# Patient Record
Sex: MALE | Race: OTHER
[De-identification: names, ages, dates, MRNs, and addresses within clinical notes are randomized per-mention and may not be internally consistent; named-entity substitution may affect disease eponyms.]

---

## 2018-10-14 ENCOUNTER — HOSPITAL ENCOUNTER (EMERGENCY)
Dept: HOSPITAL 80 - FED | Age: 28
Discharge: HOME | End: 2018-10-14
Payer: MEDICAID

## 2018-10-14 VITALS — DIASTOLIC BLOOD PRESSURE: 75 MMHG | SYSTOLIC BLOOD PRESSURE: 133 MMHG

## 2018-10-14 DIAGNOSIS — S61.232A: ICD-10-CM

## 2018-10-14 DIAGNOSIS — S63.295A: ICD-10-CM
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
## 2018-10-14 DIAGNOSIS — S62.635B: Primary | ICD-10-CM
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
## 2018-10-14 LAB — PLATELET # BLD: 199 10^3/UL (ref 150–400)

## 2018-10-14 PROCEDURE — 0HQGXZZ REPAIR LEFT HAND SKIN, EXTERNAL APPROACH: ICD-10-PCS

## 2018-10-14 NOTE — EDPHY
H & P


Time Seen by Provider: 10/14/18 16:06


HPI/ROS: 





CHIEF COMPLAINT:  Bilateral hand pain with multiple dog bites





HISTORY OF PRESENT ILLNESS:  Patient is a 28 year old male brought here by EMS 

from the local dog park after he was attacked by a pit bull.  He states that he 

was walking his dog at the dog park when dog was attacked by a pit bull.  He 

attempted to break up the fight between his dog and the pit bull and the pebble 

latched onto both of his hands.  Currently complains of inability to flex the 

left 4th digit and pain to the right middle digit.  His immunizations are up-to-

date.  Animal control was called and saw the owner of the pit bull at the dog 

park.





REVIEW OF SYSTEMS:


Constitutional:  No fever, no chills.


Eyes:  No discharge.


ENT:  No sore throat.


Cardiovascular:  No chest pain, no palpitations.


Respiratory:  No cough, no shortness of breath.


Gastrointestinal:  No abdominal pain, no vomiting.


Genitourinary:  No hematuria.


Musculoskeletal:  No back pain.


Skin:  No rashes.  Multiple skin lacerations


Neurological:  No headache.


Smoking Status: Light smoker


Physical Exam: 





General Appearance:  Alert and no distress.


Eyes:  Pupils equal and round no injection.


Respiratory:  Chest is nontender, lungs are clear to auscultation.


Cardiac:  regular rate and rhythm.


Gastrointestinal:  Abdomen is soft and nontender, no masses, bowel sounds 

normal.


Musculoskeletal:  Neck is supple and nontender.


Extremities examination of the left hand reveals deformity of the IP and distal 

phalanx of the 4th digit.  He is unable to flex the PIP of the 4th digit.  Cap 

refill is less than 3 sec in the 4th digit and sensation is intact.  There are 

2 lacerations to the lateral aspects of the digit.  Examination of the right 

hand reveals puncture wound over the middle phalanx on the flexor aspect but 

there is full range of motion and no deformity.


Skin:  No rashes or lesions.





Constitutional: 


 Initial Vital Signs











Temperature (C)  37.0 C   10/14/18 16:21


 


Heart Rate  80   10/14/18 16:21


 


Respiratory Rate  18   10/14/18 16:21


 


Blood Pressure  166/91 H  10/14/18 16:21


 


O2 Sat (%)  98   10/14/18 16:21








 











O2 Delivery Mode               Room Air














Allergies/Adverse Reactions: 


 





No Known Allergies Allergy (Verified 07/21/14 15:21)


 








Home Medications: 














 Medication  Instructions  Recorded


 


Albuterol [Proventil Inhaler (RX)] 1 - 2 puffs IH Q4 PRN 01/18/13


 


Oxycodone Ir [Oxy Ir 5 mg (RX)]  07/21/14


 


Amoxicillin/Clavulanate Pot 875 mg PO BID #14 tab 10/14/18





[Augmentin 875 MG TAB (*)]  


 


Hydrocodone/APAP 5/325 [Norco 1 - 2 tab PO Q4H PRN #10 tab 10/14/18





5/325 (*)]  














Medical Decision Making





- Diagnostics


Imaging Results: 


 Imaging Impressions





Finger X-Ray  10/14/18 16:10


Impression: Left fourth distal phalanx fracture dislocation with volar plate 

avulsion fracture fragments and dorsal subluxation/dislocation.








Finger X-Ray  10/14/18 16:11


Impression: No fracture of the right third finger.











Procedures: 





Procedure:  Laceration repair.





Verbal consent was obtained from the patient.  The 3 cm laceration on the left 

4th digit was anesthetized in the usual fashion.  The wound was irrigated, 

draped and explored.  The laceration is directly over the DIP and there is 

deformity of the joint.  No visible bony involvement.  The wound was repaired 

with 4-0 is Prolene 6 simple interrupted sutures.  The wound repair was 

approximated  The procedure was performed by myself.


ED Course/Re-evaluation: 


Patient here with dog bite to both his hands.  On arrival he was given Norco 

and Augmentin and x-rays were ordered to evaluate for fracture or foreign body.

  X-rays revealed traumatic dislocation of distal phalanx of the 4th digit on 

the left hand and normal appearing x-rays on the right hand.  Given that this 

is a traumatic open dislocation with dog bite to the left hand patient was 

given IV Unasyn and Dr Butt was paged. Dr Butt was consulted and 

recommended approximation of the wound after thorough irrigation.  He agrees to 

see the patient tomorrow morning for further evaluation.  Laceration repair as 

detailed below.  Patient was neurovascular intact at time of discharge.  





- Data Points


Laboratory Results: 


 Laboratory Results





 10/14/18 16:45 





 10/14/18 16:45 





 











  10/14/18 10/14/18





  16:45 16:45


 


WBC    5.94 10^3/uL 10^3/uL





    (3.80-9.50) 


 


RBC    5.10 10^6/uL 10^6/uL





    (4.40-6.38) 


 


Hgb    15.2 g/dL g/dL





    (13.7-17.5) 


 


Hct    42.2 % %





    (40.0-51.0) 


 


MCV    82.7 fL fL





    (81.5-99.8) 


 


MCH    29.8 pg pg





    (27.9-34.1) 


 


MCHC    36.0 g/dL g/dL





    (32.4-36.7) 


 


RDW    12.3 % %





    (11.5-15.2) 


 


Plt Count    199 10^3/uL 10^3/uL





    (150-400) 


 


Sodium  139 mEq/L mEq/L  





   (135-145)  


 


Potassium  4.0 mEq/L mEq/L  





   (3.3-5.0)  


 


Chloride  104 mEq/L mEq/L  





   ()  


 


Carbon Dioxide  23 mEq/l mEq/l  





   (22-31)  


 


Anion Gap  12 mEq/L mEq/L  





   (6-14)  


 


BUN  12 mg/dL mg/dL  





   (7-23)  


 


Creatinine  0.8 mg/dL mg/dL  





   (0.7-1.3)  


 


Estimated GFR  > 60   





   


 


Glucose  111 mg/dL H mg/dL  





   ()  


 


Calcium  9.3 mg/dL mg/dL  





   (8.5-10.4)  











Medications Given: 


 








Discontinued Medications





Hydrocodone Bitart/Acetaminophen (Norco 5/325)  1 tab PO EDNOW ONE


   Stop: 10/14/18 16:13


   Last Admin: 10/14/18 16:19 Dose:  1 tab


Amoxicillin/Clavulanate Potassium (Augmentin 875mg)  875 mg PO EDNOW ONE


   PRN Reason: Protocol


   Stop: 10/14/18 16:11


   Last Admin: 10/14/18 16:18 Dose:  875 mg


Ampicillin Sodium/Sulbactam (Sodium 3 gm/ Sodium Chloride)  100 mls @ 200 mls/

hr IV EDNOW ONE


   PRN Reason: Protocol


   Stop: 10/14/18 17:05


   Last Admin: 10/14/18 17:16 Dose:  100 mls








Departure





- Departure


Disposition: Home, Routine, Self-Care


Clinical Impression: 


 Dog bite of left hand, Dog bite of right hand, Finger laceration





Condition: Good


Instructions:  Animal Bite (ED), Finger Laceration (ED)


Additional Instructions: 


Please call Orthopedics tomorrow morning.  She be seen tomorrow morning by 

Orthopedics for further evaluation.  Take the 2nd dose of Augmentin tomorrow 

morning.  Return to the ER if you develop uncontrolled pain, discoloration of 

the finger if turning blue or ashen.


Referrals: 


NONE *PRIMARY CARE P,. [Primary Care Provider] - As per Instructions


Yousuf Butt MD [Medical Doctor] - As per Instructions


Stand Alone Forms:  School Excuse


Prescriptions: 


Amoxicillin/Clavulanate Pot [Augmentin 875 MG TAB (*)] 875 mg PO BID #14 tab


Hydrocodone/APAP 5/325 [Norco 5/325 (*)] 1 - 2 tab PO Q4H PRN #10 tab


 PRN Reason: Pain, Moderate

## 2018-10-17 ENCOUNTER — HOSPITAL ENCOUNTER (OUTPATIENT)
Dept: HOSPITAL 80 - FSGY | Age: 28
Discharge: HOME | End: 2018-10-17
Attending: ORTHOPAEDIC SURGERY
Payer: MEDICAID

## 2018-10-17 VITALS — DIASTOLIC BLOOD PRESSURE: 51 MMHG | SYSTOLIC BLOOD PRESSURE: 106 MMHG
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
## 2018-10-17 DIAGNOSIS — S61.214A: ICD-10-CM
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
## 2018-10-17 DIAGNOSIS — S66.124A: Primary | ICD-10-CM
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
## 2018-10-17 DIAGNOSIS — J45.909: ICD-10-CM

## 2018-10-17 PROCEDURE — 0J9J0ZZ DRAINAGE OF RIGHT HAND SUBCUTANEOUS TISSUE AND FASCIA, OPEN APPROACH: ICD-10-PCS | Performed by: ORTHOPAEDIC SURGERY

## 2018-10-17 PROCEDURE — 0LQ80ZZ REPAIR LEFT HAND TENDON, OPEN APPROACH: ICD-10-PCS | Performed by: ORTHOPAEDIC SURGERY

## 2018-10-17 RX ADMIN — HYDROMORPHONE HYDROCHLORIDE PRN MG: 2 INJECTION INTRAMUSCULAR; INTRAVENOUS; SUBCUTANEOUS at 17:43

## 2018-10-17 RX ADMIN — HYDROMORPHONE HYDROCHLORIDE PRN MG: 2 INJECTION INTRAMUSCULAR; INTRAVENOUS; SUBCUTANEOUS at 17:34

## 2018-10-17 NOTE — PDANEPAE
ANE History of Present Illness





bilateral hand injuries s/p dog bite





ANE Past Medical History





- Cardiovascular History


Hx Hypertension: Yes


Hx Arrhythmias: Yes


Hx Chest Pain: Yes


Hx Coronary Artery / Peripheral Vascular Disease: Yes


Hx CHF / Valvular Disease: Yes


Hx Palpitations: Yes





- Pulmonary History


Hx Asthma/Reactive Airway Disease: Yes


Hx Oxygen in Use at Home: No


Pulmonary History Comment: Hx asthma - uses inhaler (Albuterol) prn





- Neurologic History


Hx Cerebrovascular Accident: No


Hx Seizures: No


Hx Dementia: No





- Endocrine History


Hx Diabetes: No





- Renal History


Hx Renal Disorders: No





- Liver History


Hx Hepatic Disorders: No





- Neurological & Psychiatric Hx


Hx Neurological and Psychiatric Disorders: No





- Cancer History


Hx Cancer: No





- Congenital Disorder History


Hx Congenital Disorders: No





- GI History


Hx Gastrointestinal Disorders: No





- Chronic Pain History


Chronic Pain: No





ANE Review of Systems


Review of systems is: negative


Review of Systems: 








- Exercise capacity


METS (RN): 6 METS





ANE Patient History





- Allergies


Allergies/Adverse Reactions: 








No Known Allergies Allergy (Verified 07/21/14 15:21)


 








- Home Medications


Home medications: home medication list seen and reviewed


Home Medications: 








Albuterol [Proventil Inhaler (RX)] 1 - 2 puffs IH Q4 PRN 01/18/13 [Last Taken 

Unknown]


Oxycodone Ir [Oxy Ir 5 mg (RX)]  07/21/14 [Last Taken Unknown]








- NPO status


NPO Since - Liquids (Date): 10/17/18


NPO Since - Liquids (Time): 00:00


NPO Since - Solids (Date): 10/17/18


NPO Since - Solids (Time): 00:00





- Anes Hx


Anes Hx: no prior problems





- Smoking Hx


Smoking Status: Light smoker





- Family Anes Hx


Family Hx Anesthesia Complications: none known





ANE Labs/Vital Signs





- Vital Signs


Blood Pressure: 142/82


Heart Rate: 60


Respiratory Rate: 16


O2 Sat (%): 94


Height: 185.42 cm


Weight: 81.647 kg





ANE Physical Exam





- Airway


Neck exam: FROM


Mallampati Score: Class 1


Mouth exam: normal dental/mouth exam





- Pulmonary


Pulmonary: no respiratory distress





- Cardiovascular


Cardiovascular: regular rate and rhythym





- ASA Status


ASA Status: II





ANE Anesthesia Plan


Anesthesia Plan: GA w LMA

## 2018-10-17 NOTE — POSTOPPROG
Post Op Note


Date of Operation: 10/17/18


Surgeon: Yousuf Butt


Anesthesia: GET(General Endotracheal)


Pre-op Diagnosis: dog bite laceration rt hand, dog bite flexor tendon injury 

left hand


Post-op Diagnosis: same


Procedure: I&D RT hand dog bite, 2 I&D, FDP repair ring finger LT hand, A4 

pulley repa


Inf/Abcess present in the surg proc area at time of surgery?: No


EBL: Minimal


Complications: 





none

## 2018-10-17 NOTE — PDGENHP
History & Physical


Chief Complaint: Right long finger laceration, left ring finger laceration and 

FDP rupture


History of Present Illness: Dc is a pleasant 28 year old male with:  - dog 

bite to the volar right long finger without evidence of tendon or nerve 

involvement.  - dog bit to the volar left ring finger DIPJ with evidence of FDP 

avulsion fracture as well as dorsal subluxation of the distal phalanx. UDN and 

RDN appear intact


Pertinent Past, Social, Family History: PMH: negative.  Social history: non-

contributory.  FH: non-contributory


Relevant Physical Exam: Patient is a 28-year-old male.  Bilateral finger 

examination.  Inspection/palpation:  Right: 1.5 cm laceration overlying the 

volar long finger PIPJ without surrounding erythema, warmth, or drainage.  Left

: laceration overlying the ring finger volar radial and ulnar aspect of the 

DIPJ approximated with suture without erythema, warmth, or drainage.  Finger 

ROM.  Index.  MCP: 0-80 / 0-80 / 0-80.  PIP: 0-105 / 0-105 / 0-105.  DIP: 0-75 

/ 0-75 / 0-75.  Long.  MCP: 0-90 / 0-90 / 0-90.  PIP: 0-75 / 0-105 / 0-105.  DIP

: 0-45 / 0-75 / 0-75.  Ring.  MCP: 0-95 / 0-90 / 0-95.  PIP: 0-105 / 0-65 / 0-

105.  DIP: 0-75 / 0 / 0-75.  Small.  MCP: 0-100 / 0-100 / 0-100.  PIP: 0-105 / 0

-105 / 0-105.  DIP: 0-75 / 0-75 / 0-75.  Finger motor and sensory.  Index.  FDS

: + / + / +.  FDP: + / + / +.  EDC: + / + / +.  RDN: + / + / +.  UDN: + / + / +

.  Long.  FDS: + / + / +.  FDP: + / + / +.  EDC: + / + / +.  RDN: + / + / +.  

UDN: + / + / +.  Ring.  FDS: + / + / +.  FDP: + / - / +.  EDC: + / + / +.  RDN: 

+ / + / +.  UDN: + / + / +.  Small.  FDS: + / + / +.  FDP: + / + / +.  EDC: + / 

+ / +.  RDN: + / + / +.  UDN: + / + / +.  Finger tests.  Collateral ligament 

testing.  Finger:  MCP.  RCL: - / - / -.  UCL: - / - / -.  PIP.  RCL: - / - / -

.  UCL: - / - / -.  DIP.  RCL: - / - / -.  UCL: - / - / -


Cardiorespiratory Assessment: RRR, CTAB

## 2018-10-18 NOTE — GOP
DATE OF OPERATION:  10/17/2018



SURGEON:  Yousuf Butt MD



PREOPERATIVE DIAGNOSIS:  Dog bite, bilateral hands, with a right long finger laceration, left ring fi
nger laceration, and flexor digitorum profundus rupture.



POSTOPERATIVE DIAGNOSIS:  



PROCEDURE PERFORMED:  

1.  I and D, right hand, long finger. 

2.  I and D, ring finger, left hand.  

3.  Flexor digitorum profundus repair.

4.  A4 pulley repair.



FINDINGS:  





INDICATIONS:  The patient is a 28-year-old male who was attacked by a pit bull at a dog park 3 days a
go and sustained the above injuries.  He was evaluated in the office after he had been seen initially
 in the ER where his wounds were washed out and loosely closed.  The decision was made to proceed wit
h a repeat I and D, and flexor tendon repair on the left hand.



DESCRIPTION OF PROCEDURE:  After appropriate informed consent was obtained, the patient was taken to 
the operating room and placed supine on the operating room table.  Time-out was performed.  Patient w
as identified.  Correct site was identified.  He received 2 g Ancef preoperatively.  Following the in
duction of general endotracheal tube anesthesia, both upper extremities were prepped and draped in th
e usual sterile fashion.  The right hand laceration over the long finger was explored.  There was no 
obvious contamination.  It was irrigated with normal saline.  The skin was loosely closed with 3-0 ny
krystal.  I instilled 15 mL 0.5% Marcaine plain in a digital block fashion.  Sterile dressing was applied
.  We then turned our attention to the left arm.  Tourniquet was inflated to 250 mmHg.  Total tourniq
uet time was 48 minutes.  I extended the incision proximally and distally to find the 2 ends of tissu
e.  There was a complete rupture of the FDP just proximal to its insertion on the distal phalanx.  Ca
rtilage was intact in the joint.  There were no obvious signs of infection or debris.  There was an i
njury to the A4 pulley.  The tendon ends were freshened using 4-0 Ethibond in a locking Reaves stitc
h configuration.  The ends were reapproximated and tied.  There were 4 total core sutures.  I then ov
er sewed the paratenon with #4 Ethibond as well.  The A4 pulley was repaired with 2-0 Vicryl loosely 
so as not to bind on the tendon.  There was good excursion of the tendon from full extension down int
o full flexion.  The wound was irrigated with normal saline, and then the skin was loosely approximat
ed with 3-0 and 4-0 nylon.  Sterile dressing was applied.  A dorsal blocking splint with the fingers 
in slight flexion was applied.  The patient was awakened from anesthesia and taken to the recovery ro
om in satisfactory condition.  There were no immediate intraoperative complications.



COMPLICATIONS:  None.



DRAINS:  None.





Job #:  [id number]